# Patient Record
Sex: MALE | URBAN - METROPOLITAN AREA
[De-identification: names, ages, dates, MRNs, and addresses within clinical notes are randomized per-mention and may not be internally consistent; named-entity substitution may affect disease eponyms.]

---

## 2019-02-27 ENCOUNTER — APPOINTMENT (RX ONLY)
Dept: URBAN - METROPOLITAN AREA CLINIC 114 | Facility: CLINIC | Age: 74
Setting detail: DERMATOLOGY
End: 2019-02-27

## 2019-02-27 PROBLEM — D29.4 BENIGN NEOPLASM OF SCROTUM: Status: ACTIVE | Noted: 2019-02-27

## 2019-02-27 PROCEDURE — ? COUNSELING

## 2019-02-27 PROCEDURE — ? OTHER

## 2019-02-27 PROCEDURE — 99212 OFFICE O/P EST SF 10 MIN: CPT

## 2019-02-27 NOTE — HPI: SKIN LESION
Is This A New Presentation, Or A Follow-Up?: Follow Up Skin Lesion
What Type Of Note Output Would You Prefer (Optional)?: Bullet Format
How Severe Is Your Skin Lesion?: moderate
Has Your Skin Lesion Been Treated?: been treated
When Was It Treated?: 2/28/18

## 2020-08-03 ENCOUNTER — APPOINTMENT (RX ONLY)
Dept: URBAN - METROPOLITAN AREA CLINIC 110 | Facility: CLINIC | Age: 75
Setting detail: DERMATOLOGY
End: 2020-08-03

## 2020-08-03 PROBLEM — D29.4 BENIGN NEOPLASM OF SCROTUM: Status: ACTIVE | Noted: 2020-08-03

## 2020-08-03 PROCEDURE — ? DIAGNOSIS COMMENT

## 2020-08-03 PROCEDURE — ? OTHER

## 2020-08-03 PROCEDURE — ? COUNSELING

## 2020-08-03 PROCEDURE — 99212 OFFICE O/P EST SF 10 MIN: CPT

## 2020-08-03 NOTE — PROCEDURE: DIAGNOSIS COMMENT
Comment: Discussed LN2 is not a typical treatment for lesions.  Patient states understanding but reports this treatment has worked well for him in the past.
Detail Level: Simple

## 2020-08-03 NOTE — PROCEDURE: COUNSELING
Detail Level: Simple
Patient Specific Counseling (Will Not Stick From Patient To Patient): Educated pt on tx and diag.  It was explained cryo is not the best tx.  Pt states it has worked well in the past and desires cryo.

## 2020-08-03 NOTE — HPI: SKIN LESION
Is This A New Presentation, Or A Follow-Up?: Skin Lesion
What Type Of Note Output Would You Prefer (Optional)?: Bullet Format
How Severe Is Your Skin Lesion?: moderate
Has Your Skin Lesion Been Treated?: not been treated
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

## 2020-12-23 ENCOUNTER — APPOINTMENT (RX ONLY)
Dept: URBAN - METROPOLITAN AREA CLINIC 114 | Facility: CLINIC | Age: 75
Setting detail: DERMATOLOGY
End: 2020-12-23

## 2020-12-23 DIAGNOSIS — L30.4 ERYTHEMA INTERTRIGO: ICD-10-CM

## 2020-12-23 PROBLEM — D29.4 BENIGN NEOPLASM OF SCROTUM: Status: ACTIVE | Noted: 2020-12-23

## 2020-12-23 PROCEDURE — ? COUNSELING

## 2020-12-23 PROCEDURE — 99213 OFFICE O/P EST LOW 20 MIN: CPT

## 2020-12-23 PROCEDURE — ? OTHER

## 2020-12-23 PROCEDURE — ? DIAGNOSIS COMMENT

## 2020-12-23 PROCEDURE — ? TREATMENT REGIMEN

## 2020-12-23 PROCEDURE — ? PRESCRIPTION

## 2020-12-23 RX ORDER — CICLOPIROX OLAMINE 7.7 MG/G
CREAM TOPICAL BID
Qty: 3 | Refills: 3 | Status: ERX | COMMUNITY
Start: 2020-12-23

## 2020-12-23 RX ORDER — HYDROCORTISONE 25 MG/G
CREAM TOPICAL BID
Qty: 1 | Refills: 2 | Status: ERX | COMMUNITY
Start: 2020-12-23

## 2020-12-23 RX ORDER — DICAPRYLYL CARBONATE/DIMETH
SPRAY, NON-AEROSOL (ML) TOPICAL
Qty: 1 | Refills: 1 | Status: CANCELLED | COMMUNITY
Start: 2020-12-23

## 2020-12-23 RX ADMIN — CICLOPIROX OLAMINE: 7.7 CREAM TOPICAL at 00:00

## 2020-12-23 RX ADMIN — Medication: at 00:00

## 2020-12-23 RX ADMIN — HYDROCORTISONE: 25 CREAM TOPICAL at 00:00

## 2020-12-23 ASSESSMENT — PAIN INTENSITY VAS: HOW INTENSE IS YOUR PAIN 0 BEING NO PAIN, 10 BEING THE MOST SEVERE PAIN POSSIBLE?: NO PAIN

## 2020-12-23 ASSESSMENT — LOCATION SIMPLE DESCRIPTION DERM: LOCATION SIMPLE: LEFT SCROTUM

## 2020-12-23 ASSESSMENT — LOCATION DETAILED DESCRIPTION DERM: LOCATION DETAILED: LEFT INFERIOR LATERAL SCROTUM

## 2020-12-23 ASSESSMENT — LOCATION ZONE DERM: LOCATION ZONE: SCROTUM

## 2020-12-23 NOTE — PROCEDURE: MIPS QUALITY
Quality 226: Preventive Care And Screening: Tobacco Use: Screening And Cessation Intervention: Patient screened for tobacco use and is an ex/non-smoker
Quality 111:Pneumonia Vaccination Status For Older Adults: Pneumococcal Vaccination Previously Received
Detail Level: Detailed
Quality 47: Advance Care Plan: Advance Care Planning discussed and documented; advance care plan or surrogate decision maker documented in the medical record.
Quality 130: Documentation Of Current Medications In The Medical Record: Current Medications Documented
Quality 110: Preventive Care And Screening: Influenza Immunization: Influenza Immunization Administered during Influenza season
Quality 431: Preventive Care And Screening: Unhealthy Alcohol Use - Screening: Patient screened for unhealthy alcohol use using a single question and scores less than 2 times per year

## 2020-12-23 NOTE — HPI: SKIN LESION
Is This A New Presentation, Or A Follow-Up?: Follow Up Skin Lesion
What Type Of Note Output Would You Prefer (Optional)?: Bullet Format
How Severe Is Your Skin Lesion?: mild
Has Your Skin Lesion Been Treated?: not been treated
Additional History: Pt following up on angiokeratoma that was treated with LN2

## 2020-12-23 NOTE — PROCEDURE: COUNSELING
Detail Level: Simple
Patient Specific Counseling (Will Not Stick From Patient To Patient): Educated pt on tx and diag.  It was explained cryo is not the best tx.  Pt states it has worked well in the past and desires cryo.
Patient Specific Counseling (Will Not Stick From Patient To Patient): Discussed keeping area cool and dry and keeping room cool to avoid sweating at night.
Detail Level: Detailed

## 2020-12-30 RX ORDER — DICAPRYLYL CARBONATE/DIMETH
SPRAY, NON-AEROSOL (ML) TOPICAL
Qty: 1 | Refills: 1 | Status: ERX

## 2021-01-18 RX ORDER — DICAPRYLYL CARBONATE/DIMETH
SPRAY, NON-AEROSOL (ML) TOPICAL
Qty: 1 | Refills: 2 | Status: ERX | COMMUNITY
Start: 2021-01-18

## 2021-01-18 RX ADMIN — Medication: at 00:00

## 2024-01-10 NOTE — PROCEDURE: COUNSELING
Detail Level: Simple
Patient Specific Counseling (Will Not Stick From Patient To Patient): Educated pt on tx and diag.  It was explained cryo is not the best tx.  Pt states it has worked well in the past and desires cryo.
17-Jan-2024